# Patient Record
Sex: MALE | Race: WHITE
[De-identification: names, ages, dates, MRNs, and addresses within clinical notes are randomized per-mention and may not be internally consistent; named-entity substitution may affect disease eponyms.]

---

## 2018-07-03 ENCOUNTER — HOSPITAL ENCOUNTER (OUTPATIENT)
Dept: HOSPITAL 62 - RAD | Age: 43
End: 2018-07-03
Payer: OTHER GOVERNMENT

## 2018-07-03 DIAGNOSIS — M16.11: ICD-10-CM

## 2018-07-03 DIAGNOSIS — M25.551: Primary | ICD-10-CM

## 2018-07-03 DIAGNOSIS — M25.651: ICD-10-CM

## 2018-07-03 DIAGNOSIS — M25.851: ICD-10-CM

## 2018-07-03 PROCEDURE — 77002 NEEDLE LOCALIZATION BY XRAY: CPT

## 2018-07-03 PROCEDURE — 27095 INJECTION FOR HIP X-RAY: CPT

## 2018-07-03 PROCEDURE — A9576 INJ PROHANCE MULTIPACK: HCPCS

## 2018-07-03 PROCEDURE — 73722 MRI JOINT OF LWR EXTR W/DYE: CPT

## 2018-07-03 NOTE — RADIOLOGY REPORT (SQ)
EXAM DESCRIPTION:  ARTHRO HIP INJ W/ANESTHESIA; FLUORO/NEEDLE PLACEMENT



COMPLETED DATE/TIME:  7/3/2018 1:31 pm



REASON FOR STUDY:  RIGHT HIP PAIN



COMPARISON:  None.



FLUOROSCOPY TIME:  7 seconds

1 digital radiograph images saved to PACS.



LIMITATIONS:  None.



PROCEDURE:   Procedure, risks, benefits and alternatives explained to patient who then gave written c
onsent. The right hip was marked and a time-out was called for correct marking verification.   Entry 
site marked using fluoroscopic guidance.  Hip prepped and draped using sterile technique.   Local ane
sthesia achieved using 5 mL of 1% lidocaine injection.  22 gauge spinal needle introduced into the mallory
int space under direct fluoroscopic visualization.  Non-ionic contrast instilled to confirm intra-art
icular position.  Dilute gadolinium solution then injected.   Needle removed and entry site covered w
ith sterile bandage. No immediate complications noted.



TECHNIQUE:  Digital images acquired during fluoroscopy and stored on PACS.   Patient immediately take
n to the MR suite for additional imaging.

INJECTION LOCATION: Right hip joint

CONTRAST TYPE AND AMOUNT: 1 mL of Isovue-300 was injected to confirm intra-articular needle placement
 followed by 8 mL of dilute Prohance/Saline mixture.



IMPRESSION:  SUCCESSFUL NEEDLE PLACEMENT AND INJECTION FOR RIGHT HIP MR ARTHROGRAM.



COMMENT:  Quality :  Final reports for procedures using fluoroscopy that document radiation exp
osure indices, or exposure time and number of fluorographic images (if radiation exposure indices are
 not available)



TECHNICAL DOCUMENTATION:  JOB ID:  [id number]

 2011 [employer]- All Rights Reserved



Reading location - IP/workstation name: [email or web address]

## 2018-07-03 NOTE — RADIOLOGY REPORT (SQ)
EXAM DESCRIPTION:  ARTHRO HIP INJ W/ANESTHESIA; FLUORO/NEEDLE PLACEMENT



COMPLETED DATE/TIME:  7/3/2018 1:31 pm



REASON FOR STUDY:  RIGHT HIP PAIN



COMPARISON:  None.



FLUOROSCOPY TIME:  7 seconds

1 digital radiograph images saved to PACS.



LIMITATIONS:  None.



PROCEDURE:   Procedure, risks, benefits and alternatives explained to patient who then gave written c
onsent. The right hip was marked and a time-out was called for correct marking verification.   Entry 
site marked using fluoroscopic guidance.  Hip prepped and draped using sterile technique.   Local ane
sthesia achieved using 5 mL of 1% lidocaine injection.  22 gauge spinal needle introduced into the mallory
int space under direct fluoroscopic visualization.  Non-ionic contrast instilled to confirm intra-art
icular position.  Dilute gadolinium solution then injected.   Needle removed and entry site covered w
ith sterile bandage. No immediate complications noted.



TECHNIQUE:  Digital images acquired during fluoroscopy and stored on PACS.   Patient immediately take
n to the MR suite for additional imaging.

INJECTION LOCATION: Right hip joint

CONTRAST TYPE AND AMOUNT: 1 mL of Isovue-300 was injected to confirm intra-articular needle placement
 followed by 8 mL of dilute Prohance/Saline mixture.



IMPRESSION:  SUCCESSFUL NEEDLE PLACEMENT AND INJECTION FOR RIGHT HIP MR ARTHROGRAM.



COMMENT:  Quality :  Final reports for procedures using fluoroscopy that document radiation exp
osure indices, or exposure time and number of fluorographic images (if radiation exposure indices are
 not available)



TECHNICAL DOCUMENTATION:  JOB ID:  [id number]

 2011 [employer]- All Rights Reserved



Reading location - IP/workstation name: [facility]

## 2018-07-04 NOTE — RADIOLOGY REPORT (SQ)
EXAM DESCRIPTION:  MRI RT LOWER JOINT WITH



COMPLETED DATE/TIME:  7/3/2018 2:36 pm



REASON FOR STUDY:  RIGHT HIP PAIN



COMPARISON:  Plain radiograph



TECHNIQUE:  Post arthrogram imaging is performed using T1 and T1 and T2 fat saturated sequences of th
e pelvis and specific hip of interest.



LIMITATIONS:  None.



FINDINGS:  JOINT DISTENSION: Adequate. No loose body.

BONE MARROW: No edema. No marrow replacement.

FEMORAL HEAD, NECK, AND ACETABULUM: Osteophytes.  No marrow replacement.  There is a large subchondra
l cysts which communicates with the joint of the weight-bearing surface of the acetabulum.

PUBIC RAMI AND ISCHIUM: No occult fracture.

SACRUM AND BAKARI: SI joints normal in signal. No occult fracture.

EFFUSIONS: None.

LABRUM AND CARTILAGE: Marked degeneration of the lateral labrum.  There is cartilaginous thinning lat
erally.

MUSCLES AND SOFT TISSUES: Adductors and piriformis normal. Abductors and greater trochanteric bursa n
ormal without edema or fluid. Iliopsoas bursa without fluid. Hamstring attachments without edema or t
ear.

PELVIC SOFT TISSUES: No masses or adenopathy.

SCIATIC NERVE: Identified without masses.

OTHER: No other significant finding.



IMPRESSION:  Osteoarthritis of the right hip with osteophytes of the femoral head.  There is a large 
communicating subchondral cysts in the acetabulum.  Diffuse degeneration of the lateral labrum with l
ateral cartilaginous thinning.



TECHNICAL DOCUMENTATION:  JOB ID:  [id number]

 2011 [employer]- All Rights Reserved



Reading location - IP/workstation name: [email or web address]